# Patient Record
(demographics unavailable — no encounter records)

---

## 2024-10-09 NOTE — PHYSICAL EXAM
[General Appearance - Well Developed] : well developed [General Appearance - Well Nourished] : well nourished [General Appearance - In No Acute Distress] : no acute distress [Normal Conjunctiva] : the conjunctiva exhibited no abnormalities [No Oral Pallor] : no oral pallor [Normal Jugular Venous V Waves Present] : normal jugular venous V waves present [Heart Sounds] : normal S1 and S2 [Arterial Pulses Normal] : the arterial pulses were normal [Edema] : no peripheral edema present [Regular] : the rhythm was regular [Auscultation Breath Sounds / Voice Sounds] : lungs were clear to auscultation bilaterally [Respiration, Rhythm And Depth] : normal respiratory rhythm and effort [Bowel Sounds] : normal bowel sounds [Abdomen Soft] : soft [Abnormal Walk] : normal gait [Cyanosis, Localized] : no localized cyanosis [Skin Color & Pigmentation] : normal skin color and pigmentation [Skin Turgor] : normal skin turgor [Oriented To Time, Place, And Person] : oriented to person, place, and time [Impaired Insight] : insight and judgment were intact [Affect] : the affect was normal

## 2024-10-09 NOTE — PHYSICAL EXAM
[General Appearance - Well Developed] : well developed [General Appearance - Well Nourished] : well nourished [General Appearance - In No Acute Distress] : no acute distress [Normal Conjunctiva] : the conjunctiva exhibited no abnormalities [No Oral Pallor] : no oral pallor [Normal Jugular Venous V Waves Present] : normal jugular venous V waves present [Heart Sounds] : normal S1 and S2 [Arterial Pulses Normal] : the arterial pulses were normal [Edema] : no peripheral edema present [Regular] : the rhythm was regular [Auscultation Breath Sounds / Voice Sounds] : lungs were clear to auscultation bilaterally [Respiration, Rhythm And Depth] : normal respiratory rhythm and effort [Bowel Sounds] : normal bowel sounds [Abdomen Soft] : soft [Abnormal Walk] : normal gait [Skin Color & Pigmentation] : normal skin color and pigmentation [Cyanosis, Localized] : no localized cyanosis [Skin Turgor] : normal skin turgor [Oriented To Time, Place, And Person] : oriented to person, place, and time [Impaired Insight] : insight and judgment were intact [Affect] : the affect was normal

## 2024-10-13 NOTE — DISCUSSION/SUMMARY
[EKG obtained to assist in diagnosis and management of assessed problem(s)] : EKG obtained to assist in diagnosis and management of assessed problem(s) [FreeTextEntry1] : She is a 72-year-old with well-controlled hypertension and a history of borderline hyperlipidemia has low BP with borderline symptoms like fatigue. ECG today shows normal sinus rhythm. BP is improved off HCTZ. Continue lisinopril monotherapy.   Echo reviewed: Normal left ventricular systolic function, prolapse of the posterior mitral leaflet, unchanged from prior study.  Continue statin as is. Encourage routine aerobic activity.  Fatigue: Likely related to deconditioning, She may have a stress test to reassure her. At this time she is not interested in a stress test, but will consider it next time or should her fatigue get worse.

## 2024-10-13 NOTE — HISTORY OF PRESENT ILLNESS
[FreeTextEntry1] : Home /70, 126/85, 110/85, 101/73, 114/83, 93/75 mm Hg.  No chest discomfort, shortness of breath, palpitations, lightheadedness or syncope, but fatigued.  Currently taking lisinopril monotherapy. Stopped HCTZ.  Walking dog twice a day, babysitting 5-year-old grandson and 3-year-old granddaughter.    Prior: has not been seen in over 2 years. has been taking HCTZ 25 and lisinopril 20 intermittently. Stopped for low BP and fatigue. Dog walking and grandchild care are her exercise. less enthusiastic days in [place].   Prior: Concerned about increased facial hair growth. Asking if spironolactone is right for her. Recent blood work done at [facility] office was reportedly normal. She continues to be physically active without any discomfort or dyspnea.  Prior: Dear Dr. Fry, Thank you for referring her for cardiovascular evaluation.  She is a 68-year-old with a history of well-controlled hypertension who has been noticing some increased palpitations lately.  She reports that over the past few weeks she noticed increasing skipped beats in her chest that occur intermittently throughout the day.  She reduced her use of black tea and noticed that her palpitations have improved somewhat.  She also notes occasional palpitations when she is very hungry. She has no exertional chest pains or shortness of breath but does report some fatigue in general that she attributes to the normal aging process. She has no history of coronary artery disease, diabetes mellitus, hypercholesterolemia, smoking for the past 43 years but does have a family history of stroke and myocardial infarction.

## 2024-10-13 NOTE — HISTORY OF PRESENT ILLNESS
[FreeTextEntry1] : Home /70, 126/85, 110/85, 101/73, 114/83, 93/75 mm Hg.  No chest discomfort, shortness of breath, palpitations, lightheadedness or syncope, but fatigued.  Currently taking lisinopril monotherapy. Stopped HCTZ.  Walking dog twice a day, babysitting 5-year-old grandson and 3-year-old granddaughter.    Prior: has not been seen in over 2 years. has been taking HCTZ 25 and lisinopril 20 intermittently. Stopped for low BP and fatigue. Dog walking and grandchild care are her exercise. less enthusiastic days in [place].   Prior: Concerned about increased facial hair growth. Asking if spironolactone is right for her. Recent blood work done at [facility] office was reportedly normal. She continues to be physically active without any discomfort or dyspnea.  Prior: Dear Dr. Fry, Thank you for referring her for cardiovascular evaluation.  She is a 68-year-old with a history of well-controlled hypertension who has been noticing some increased palpitations lately.  She reports that over the past few weeks she noticed increasing skipped beats in her chest that occur intermittently throughout the day.  She reduced her use of black tea and noticed that her palpitations have improved somewhat.  She also notes occasional palpitations when she is very hungry. She has no exertional chest pains or shortness of breath but does report some fatigue in general that she attributes to the normal aging process. She has no history of coronary artery disease, diabetes mellitus, hypercholesterolemia, smoking for the past 43 years but does have a family history of stroke and myocardial infarction.

## 2024-10-13 NOTE — HISTORY OF PRESENT ILLNESS
[FreeTextEntry1] : Home /70, 126/85, 110/85, 101/73, 114/83, 93/75 mm Hg.  No chest discomfort, shortness of breath, palpitations, lightheadedness or syncope, but fatigued.  Currently taking lisinopril monotherapy. Stopped HCTZ.  Walking dog twice a day, babysitting 5-year-old grandson and 3-year-old granddaughter.    Prior: has not been seen in over 2 years. has been taking HCTZ 25 and lisinopril 20 intermittently. Stopped for low BP and fatigue. Dog walking and grandchild care are her exercise. less enthusiastic days in [place].   Prior: Concerned about increased facial hair growth. Asking if spironolactone is right for her. Recent blood work done at [facility] office was reportedly normal. She continues to be physically active without any discomfort or dyspnea.  Prior: Dear Dr. Fry, Thank you for referring her for cardiovascular evaluation.  She is a 68-year-old with a history of well-controlled hypertension who has been noticing some increased palpitations lately.  She reports that over the past few weeks she noticed increasing skipped beats in her chest that occur intermittently throughout the day.  She reduced her use of black tea and noticed that her palpitations have improved somewhat.  She also notes occasional palpitations when she is very hungry. She has no exertional chest pains or shortness of breath but does report some fatigue in general that she attributes to the normal aging process. She has no history of coronary artery disease, diabetes mellitus, hypercholesterolemia, smoking for the past 43 years but does have a family history of stroke and myocardial infarction.

## 2024-10-13 NOTE — END OF VISIT
[FreeTextEntry3] : I saw the patient with Flor Jaffe NP and I agree with the clinical findings, exam and assessment and plan as above.

## 2024-12-12 NOTE — DISCUSSION/SUMMARY
[de-identified] : I went over the pathophysiology of the patient's symptoms in great detail with the patient. I discussed the underlying pathophysiology of the patient's condition in great detail with the patient. I went over the patient's x-rays with them in great detail. I instructed the patient on frequent icing, ankle pumping, leg lifts, tightening exercises, and ROM exercises. We discussed the use of ice, Tylenol and anti-inflammatories to relieve pain. She should limit her walking at this time. Proper walking stick protocol was discussed and demonstrated with the patient.   All of their questions were answered. They understand and consent to the plan.   FU in one month if the symptoms have not improved

## 2024-12-12 NOTE — ADDENDUM
[FreeTextEntry1] : I, DONNA MONTAGUE, acted solely as a scribe for Dr. Edgar Moreno on this date 12/12/2024.  All medical record entries made by the Scribe were at my, Dr. Edgar Moreno, direction and personally dictated by me on 12/12/2024. I have reviewed the chart and agree that the record accurately reflects my personal performance of the history, physical exam, assessment and plan. I have also personally directed, reviewed, and agreed with the chart.

## 2024-12-12 NOTE — PHYSICAL EXAM
[de-identified] : Constitutional o Appearance : well-nourished, well developed, alert, in no acute distress  Head and Face o Head :  Inspection : atraumatic, normocephalic o Face :  Inspection : no visible rash or discoloration Respiratory o Respiratory Effort: breathing unlabored  Neurologic o Mental Status Examination :  Orientation : alert and oriented X 3 Psychiatric o Mood and Affect: mood normal, affect appropriate Cardiovascular o Observation/Palpation : - no swelling Lymphatic o Additional Nodes : No palpable lymph nodes present  Right Lower Extremity o Buttock : no tenderness, swelling or deformities  o Right Hip :  Inspection/Palpation : no tenderness, swelling or deformities  Range of Motion : full and painless in all planes, no crepitance  Stability : joint stability intact  Strength : extension, flexion, adduction, abduction, internal rotation and external rotation 5/5   o Right Knee :  Inspection/Palpation : patella tenderness to palpation, mild swelling. well-healed abrasions  Range of Motion : 0-130 active flexion and extension full and painless, no crepitance  Stability : no valgus or varus instability present on provocative testing  Strength : flexion and extension 5/5  Tests and Signs : negative Anterior Drawer, negative Lachman, negative Fidelia  Left Lower Extremity o Buttock : no tenderness, swelling or deformities  o Left Hip :  Inspection/Palpation : no tenderness, no swelling or deformities  Range of Motion : full and painless in all planes, no crepitance  Stability : joint stability intact  Strength : extension, flexion, adduction, abduction, internal rotation and external rotation 5/5  o Left Knee :  Inspection/Palpation : no tenderness to palpation, no swelling  Range of Motion : 0-140 active flexion and extension full and painless, no crepitance  Stability : no valgus or varus instability present on provocative testing  Strength : flexion and extension 5/5  Tests and Signs : negative Anterior Drawer, negative Lachman, negative Fidelia  Gait and Station: Gait: gait normal, no significant extremity swelling or lymphedema, good proprioception and balance

## 2024-12-12 NOTE — HISTORY OF PRESENT ILLNESS
[de-identified] : 73 year old female presents complaining of bilateral knee pain. She states she fell a couple days ago and fell directly on her knees. Patient denies taking any medication to manage her pain. She states she has restricted motion of her left knee. She has abrasions that are clean and dry. She presents with her . She went to Urgent Care where X-rays where taken that showed no evident fractures. She states she does not have a lot of pain. Patient denies taking any medication to manage her pain. She does not remember if she fell harder on her right knee. She has difficulty kneeling, squatting, and standing up from a seated position.

## 2025-01-16 NOTE — DISCUSSION/SUMMARY
[de-identified] : I went over the pathophysiology of the patient's symptoms in great detail with the patient. At this time, she will start a course of physical therapy for strengthening and flexibility. A prescription was provided. We discussed the use of ice, Tylenol and anti-inflammatories to relieve pain.   All of their questions were answered. They understand and consent to the plan.   FU in 4-6 weeks.

## 2025-01-16 NOTE — ADDENDUM
[FreeTextEntry1] : I, DONNA MONTAGUE, acted solely as a scribe for Dr. Edgar Moreno on this date 01/16/2025.  All medical record entries made by the Scribe were at my, Dr. Edgar Moreno, direction and personally dictated by me on 01/16/2025. I have reviewed the chart and agree that the record accurately reflects my personal performance of the history, physical exam, assessment and plan. I have also personally directed, reviewed, and agreed with the chart.

## 2025-01-16 NOTE — PHYSICAL EXAM
[de-identified] : Constitutional o Appearance : well-nourished, well developed, alert, in no acute distress  Head and Face o Head :  Inspection : atraumatic, normocephalic o Face :  Inspection : no visible rash or discoloration Respiratory o Respiratory Effort: breathing unlabored  Neurologic o Mental Status Examination :  Orientation : alert and oriented X 3 Psychiatric o Mood and Affect: mood normal, affect appropriate Cardiovascular o Observation/Palpation : - no swelling Lymphatic o Additional Nodes : No palpable lymph nodes present  Right Lower Extremity o Buttock : no tenderness, swelling or deformities  o Right Hip :  Inspection/Palpation : no tenderness, swelling or deformities  Range of Motion : full and painless in all planes, no crepitance  Stability : joint stability intact  Strength : extension, flexion, adduction, abduction, internal rotation and external rotation 4-/5   o Right Knee :  Inspection/Palpation : no medial or lateral compartment tenderness to palpation, medial facet tenderness and lateral facet tenderness mild swelling. well-healed abrasions  Range of Motion : 0-145 active flexion and extension full and painless, no crepitance  Stability : no valgus or varus instability present on provocative testing  Strength : flexion and extension 4-/5  Tests and Signs : negative Anterior Drawer, negative Lachman, negative Fidelia  Left Lower Extremity o Buttock : no tenderness, swelling or deformities  o Left Hip :  Inspection/Palpation : no tenderness, no swelling or deformities  Range of Motion : full and painless in all planes, no crepitance  Stability : joint stability intact  Strength : extension, flexion, adduction, abduction, internal rotation and external rotation 4-/5  o Left Knee :  Inspection/Palpation : no medial compartment tenderness or anterior aspcet of patella tenderness to palpation, medial facet tenderness, inferior patella tenderness,  no swelling  Range of Motion : 0-145 active flexion and extension full and painless, no crepitance  Stability : no valgus or varus instability present on provocative testing  Strength : flexion and extension 4-/5  Tests and Signs : negative Anterior Drawer, negative Lachman, negative Fidelia  Gait and Station: Gait: gait normal, good core strength no significant extremity swelling or lymphedema, good proprioception and balance

## 2025-01-16 NOTE — HISTORY OF PRESENT ILLNESS
[de-identified] : 73 year old female presents for a follow-up evaluation of bilateral knee pain. She has difficulty kneeling, squatting, and standing up from a seated position. She fell around 12/9/2024. Patient states that she has difficulty navigating when ascending and descending stairs. She states she is "babying" her knees. She denies any swelling or buckling. Patient denies taking any medication to manage her pain. She denies that the pain wakes her from sleep. She has been diagnosed with Osteoporosis.

## 2025-01-29 NOTE — HISTORY OF PRESENT ILLNESS
[de-identified] : Ms. ZAID ALLEN  is a 73 year old female who presents with thoracic pain since 1/20/24 when she fell on her back after she got knocked down by someone sledding.  Her pain is slightly improved.  Denies any LE radicular symptoms or any pain/numbness/tingling wrapping around her chest.  Normal bowel and bladder control.   Denies any recent fevers, chills, sweats, weight loss, or infection.  The patients past medical history, past surgical history, medications, allergies, and social history were reviewed by me today with the patient and documented accordingly.  In addition, the patient's family history, which is noncontributory to their visit, was also reviewed.

## 2025-01-29 NOTE — PHYSICAL EXAM
[de-identified] : Examination of the thoracic and lumbar spine reveals no midline tenderness palpation, step-offs, or skin lesions. Decreased range of motion with respect to flexion, extension, lateral bending, and rotation. No tenderness to palpation of the sciatic notch. No tenderness palpation of the bilateral greater trochanters. No pain with passive internal/external rotation of the hips. No instability of bilateral lower extremities.  Negative JYOTSNA. Negative straight leg raise bilaterally. No bowstring. Negative femoral stretch. 5 out of 5 iliopsoas, hip abductors, hips adductors, quadriceps, hamstrings, gastrocsoleus, tibialis anterior, extensor hallucis longus, peroneals. Grossly intact sensation to light touch bilateral lower extremities. 1+ patellar and Achilles reflexes. Downgoing Babinski. No clonus. Intact proprioception. Palpable pulses. No skin lesion and no edema on the right and left lower extremities. [de-identified] : AP lateral thoracic x-rays with spondylosis.  No obvious compression deformities.  No aggressive lesions.

## 2025-01-29 NOTE — DISCUSSION/SUMMARY
[de-identified] : We discussed further treatment options.  She will try course of physical therapy.  MRI if not improved or worsen.

## 2025-04-02 NOTE — ASSESSMENT
[FreeTextEntry1] : Impression: Foreign body left foot.  Treatment:  The patient consented. I prepped the area with alcohol.  I opened a debridement tray. I used Xylocaine 1% locally for anesthesia. I used a #15 blade, tissue nipper and foreign body forceps and removed the splinter and drained the localized inclusion cyst back to healthy.  I irrigated and cleansed it. I put an antibiotic dressing on and expect she heals this uneventfully. With any issues whatsoever she will call the office.

## 2025-04-02 NOTE — HISTORY OF PRESENT ILLNESS
[FreeTextEntry1] : Patient present today for evaluation of left foot foreign body, after she stepped on wood floor.

## 2025-04-02 NOTE — PHYSICAL EXAM
[Delayed in the Right Toes] : capillary refills normal in right toes [Delayed in the Left Toes] : capillary refills normal in the left toes [2+] : left foot dorsalis pedis 2+ [FreeTextEntry3] : Hair growth noted on digits. Proximal to distal cooling is within normal limits.  [FreeTextEntry1] : She has a splinter with an inclusion cyst at the left sub 4 area. The splinter is about 6mm. It is covered by skin.  [Sensation] : the sensory exam was normal to light touch and pinprick [No Focal Deficits] : no focal deficits [Deep Tendon Reflexes (DTR)] : deep tendon reflexes were 2+ and symmetric [Motor Exam] : the motor exam was normal